# Patient Record
Sex: MALE | Race: WHITE
[De-identification: names, ages, dates, MRNs, and addresses within clinical notes are randomized per-mention and may not be internally consistent; named-entity substitution may affect disease eponyms.]

---

## 2020-11-18 ENCOUNTER — HOSPITAL ENCOUNTER (EMERGENCY)
Dept: HOSPITAL 65 - ER | Age: 35
LOS: 1 days | Discharge: HOME | End: 2020-11-19
Payer: COMMERCIAL

## 2020-11-18 VITALS — SYSTOLIC BLOOD PRESSURE: 145 MMHG | DIASTOLIC BLOOD PRESSURE: 71 MMHG

## 2020-11-18 VITALS — HEIGHT: 75 IN | BODY MASS INDEX: 34.82 KG/M2 | WEIGHT: 280 LBS

## 2020-11-18 DIAGNOSIS — Z20.828: ICD-10-CM

## 2020-11-18 DIAGNOSIS — Z11.59: Primary | ICD-10-CM

## 2020-11-18 DIAGNOSIS — F17.210: ICD-10-CM

## 2020-11-18 PROCEDURE — 87426 SARSCOV CORONAVIRUS AG IA: CPT

## 2020-11-18 PROCEDURE — 99283 EMERGENCY DEPT VISIT LOW MDM: CPT

## 2020-11-18 PROCEDURE — 36415 COLL VENOUS BLD VENIPUNCTURE: CPT

## 2020-11-19 NOTE — NUR
COVID RESULTS



PATIENT IS A [employer] EMPLOYEE, STATES HE NEEDS A NEGATIVE RESULT TO 
RETURN TO WORK TONIGHT. PER POLICY, PATIENT QUALIFIES TO BE TESTED WITH RAPID 
COVID SWAB.

## 2020-11-19 NOTE — ER.PDOC
General


Chief Complaint:  Cough/Congestion


Stated Complaint:  COVID-19 EXPOSURE


TRAVEL OUT OF US:  No


Time seen by MD:  23:15


Source:  patient


Exam Limitations:  no limitations





History of Present Illness


Initial Comments


Pt's daughter came down with Covid.  Pt has had no symptoms at all and has 

quarietined for 14 days.  Pt just cant go to work without a neg test.


Timing/Duration:  other (exposed 2 weeks ago by daughter)


Associated Symptoms:  denies symptoms


Allergies:  


Coded Allergies:  


     No Known Allergies (Unverified , 7/20/14)





Past Medical History


Medical History:  no pertinent history


Surgical History:  other (multiple back surgeries.)





Family History


Significant Family History:  no pertinent family hx





Social History


Smoking:  chew, less than 1 pack/day


Alcohol Use:  none


Drug Use:  none





Review of Systems


All Other Systems:  Reviewed and Negative





Physical Exam


General Appearance:  No Apparent Distress, WD/WN


EENT:  eyes nml inspection, nml ENT inspection, pharynx nml


Neck:  Non-Tender, Full Range of Motion


Respiratory:  chest non-tender, lungs clear, normal breath sounds, no 

respiratory distress


CVS:  reg rate & rhythm, no murmur, no gallop, pulses nml


Gastrointestinal:  Normal Bowel Sounds, No Organomegaly, No Pulsatile Mass, Non 

Tender, Absent bowel sounds


Extremities:  Normal Range of Motion


Neurologic/Psychiatric:  CNs II-XII NML as Tested, No Motor/Sensory Deficits, 

Alert, Normal Mood/Affect, Oriented x 3


Skin:  Normal Color, Warm/Dry


Lymphatic:  No Adenopathy





Results/Orders


Results/Orders





Orders - SHERYL ALVAREZ MD


Covid19 Antigen Rosi Gant (11/18/20 23:33)





Vital Signs








  Date Time  Temp Pulse Resp B/P (MAP) Pulse Ox O2 Delivery O2 Flow Rate FiO2


 


11/18/20 23:24 98.3 87 18     


 


11/18/20 23:24 98.3 87 18  98   


 


11/18/20 23:24 98.3 87 18 148/71 (96) 98   








                                Laboratory Tests








Test


 11/18/20


23:19


 


SARS-CoV-2 Antigen (Rapid)


 NEGATIVE


(NEGATIVE)











Progress


Progress


Rapid covid test is neg.  Pt given note to return to work





ER DEPART


Departure


Time of Disposition:  00:16


Disposition:  01 HOME, SELF-CARE


Impression:  


   Primary Impression:  


   COVID-19 ruled out


Condition:  Stable


Referrals:  


GREER BENNETT MD (PCP)


PRIMARY CARE PROVIDER


Duration or Time Spent with Pa:  10m











SHERYL ALVAREZ MD            Nov 19, 2020 00:16

## 2021-01-06 ENCOUNTER — HOSPITAL ENCOUNTER (EMERGENCY)
Dept: HOSPITAL 65 - ER | Age: 36
Discharge: HOME | End: 2021-01-06
Payer: OTHER GOVERNMENT

## 2021-01-06 VITALS — SYSTOLIC BLOOD PRESSURE: 142 MMHG | DIASTOLIC BLOOD PRESSURE: 66 MMHG

## 2021-01-06 VITALS — SYSTOLIC BLOOD PRESSURE: 129 MMHG | DIASTOLIC BLOOD PRESSURE: 69 MMHG

## 2021-01-06 VITALS — WEIGHT: 285 LBS | BODY MASS INDEX: 34.7 KG/M2 | HEIGHT: 76 IN

## 2021-01-06 DIAGNOSIS — N34.2: Primary | ICD-10-CM

## 2021-01-06 PROCEDURE — 99283 EMERGENCY DEPT VISIT LOW MDM: CPT

## 2021-01-06 PROCEDURE — 96372 THER/PROPH/DIAG INJ SC/IM: CPT

## 2021-01-06 NOTE — ER.PDOC
General


Chief Complaint:  Male 


Stated Complaint:  MALE 


Time seen by MD:  10:29


Source:  patient


Exam Limitations:  no limitations





History of Present Illness


Initial Comments


Patient c/o dysuria and genital rash x 1 week--concerned for possible STD.


Timing/Duration:  week


Severity/Quality:  moderate


Location:  urethral


Associated Symptoms:  Dysuria


Sexual History:  Unprotected intercourse


Prior symptoms/Treatment:  Similar symptoms previous


Allergies:  


Coded Allergies:  


     No Known Allergies (Unverified , 7/20/14)





Past Medical History


Medical History:  other


Surgical History:  appendectomy, back, gastric bypass, tonsillectomy





Family History


Significant Family History:  no pertinent family hx





Social History


Smoking:  non-smoker


Alcohol Use:  occassionally


Drug Use:  none





Review of Systems


Constitutional:  denies no symptoms reported, denies see HPI, denies chills, 

denies diaphoresis, denies fever, denies malaise, denies weakness, denies other


EENTM:  denies no symptoms reported, denies see HPI, denies eye pain, denies juan m

rred vision, denies tearing, denies double vision, denies ear pain, denies ear 

discharge, denies nose pain, denies nose congestion, denies throat pain, denies 

throat swelling, denies mouth pain, denies mouth swelling, denies other


Respiratory:  denies no symptoms reported, denies see HPI, denies cough, denies 

orthopnea, denies shortness of breath, denies stridor, denies wheezing, denies 

other


Cardiovascular:  denies no symptoms reported, denies see HPI, denies chest pain,

denies edema, denies palpitations, denies syncope, denies other


Gastrointestinal:  denies no symptoms reported, denies see HPI, denies abdominal

pain, denies constipation, denies diarrhea, denies nausea, denies vomiting, 

denies other


Genitourinary:  see HPI


Musculoskeletal:  denies no symptoms reported, denies see HPI, denies back pain,

denies gout, denies joint pain, denies joint swelling, denies muscle pain, 

denies muscle stiffness, denies neck pain, denies other


Skin:  denies no symptoms reported, denies see HPI, denies change in color, 

denies change in hair/nails, denies dryness, denies lesions, denies lumps, 

denies rash, denies other


All Other Systems:  Reviewed and Negative





Physical Exam


General Appearance:  No Apparent Distress, WD/WN


EENT:  eyes nml inspection, nml ENT inspection


Neck:  nml inspection, non-tender


Cardiovascular/Respiratory:  Regular Rate, Rhythm, Normal Breath Sounds, No 

Respiratory Distress


Abdomen:  Non Tender, Soft


Extremities:  Non-Tender, Normal Inspection, No Pedal Edema, No Calf Tenderness


Neurologic/Psychiatric:  Alert, Normal Mood/Affect, Oriented x 3


Skin:  Normal Color, Warm/Dry





Results/Orders


Results/Orders





Orders - REMIGIO BARRY DO


Ceftriaxone Sodium (Rocephin) (1/6/21 10:42)


Ceftriaxone Sodium (Rocephin) (1/6/21 10:46)





Vital Signs








  Date Time  Temp Pulse Resp B/P (MAP) Pulse Ox O2 Delivery O2 Flow Rate FiO2


 


1/6/21 10:04 98.1 70 16  97   


 


1/6/21 10:04 98.1 70 18     


 


1/6/21 10:04 98.1 70 18 142/66 (91) 97 Room Air  











Progress


Progress


we will treat presumptively





ER DEPART


Departure


Time of Disposition:  10:52


Disposition:  01 HOME, SELF-CARE


Impression:  


   Primary Impression:  


   Dysuria


   Additional Impression:  


   Urethritis


Condition:  Stable


Patient Instructions:  Urethritis, Adult


Referrals:  


GREER BENNETT MD (PCP)


PRIMARY CARE PROVIDER





Additional Instructions:  


Take antibiotics as prescribed until all gone.  Follow up with your doctor next 

week for reevaluation.  If you want testing for STDs, see your PCP or the [facility].  You partner should be evaluated/treated as necessary 

(at PCP office of Health Department).


Duration or Time Spent with Pa:  10 min





Problem Qualifiers











REMIGIO BARRY DO             Jan 6, 2021 10:29